# Patient Record
Sex: MALE | Race: OTHER | HISPANIC OR LATINO | URBAN - METROPOLITAN AREA
[De-identification: names, ages, dates, MRNs, and addresses within clinical notes are randomized per-mention and may not be internally consistent; named-entity substitution may affect disease eponyms.]

---

## 2022-03-17 ENCOUNTER — HOSPITAL ENCOUNTER (OUTPATIENT)
Facility: HOSPITAL | Age: 37
Discharge: HOME | End: 2022-03-19
Attending: EMERGENCY MEDICINE | Admitting: INTERNAL MEDICINE

## 2022-03-17 DIAGNOSIS — R06.2 WHEEZING: ICD-10-CM

## 2022-03-17 DIAGNOSIS — J45.901 EXACERBATION OF ASTHMA, UNSPECIFIED ASTHMA SEVERITY, UNSPECIFIED WHETHER PERSISTENT: Primary | ICD-10-CM

## 2022-03-17 LAB
ALBUMIN SERPL BCP-MCNC: 4.8 G/DL (ref 3.5–5.2)
ALP SERPL-CCNC: 158 U/L (ref 55–135)
ALT SERPL W/O P-5'-P-CCNC: 41 U/L (ref 10–44)
ANION GAP SERPL CALC-SCNC: 14 MMOL/L (ref 8–16)
AST SERPL-CCNC: 34 U/L (ref 10–40)
BASOPHILS # BLD AUTO: 0.04 K/UL (ref 0–0.2)
BASOPHILS NFR BLD: 0.4 % (ref 0–1.9)
BILIRUB SERPL-MCNC: 0.8 MG/DL (ref 0.1–1)
BUN SERPL-MCNC: 12 MG/DL (ref 6–20)
CALCIUM SERPL-MCNC: 10.5 MG/DL (ref 8.7–10.5)
CHLORIDE SERPL-SCNC: 102 MMOL/L (ref 95–110)
CO2 SERPL-SCNC: 21 MMOL/L (ref 23–29)
CREAT SERPL-MCNC: 0.8 MG/DL (ref 0.5–1.4)
CTP QC/QA: YES
CTP QC/QA: YES
DIFFERENTIAL METHOD: ABNORMAL
EOSINOPHIL # BLD AUTO: 0.2 K/UL (ref 0–0.5)
EOSINOPHIL NFR BLD: 1.6 % (ref 0–8)
ERYTHROCYTE [DISTWIDTH] IN BLOOD BY AUTOMATED COUNT: 12.7 % (ref 11.5–14.5)
EST. GFR  (AFRICAN AMERICAN): >60 ML/MIN/1.73 M^2
EST. GFR  (NON AFRICAN AMERICAN): >60 ML/MIN/1.73 M^2
GLUCOSE SERPL-MCNC: 135 MG/DL (ref 70–110)
HCT VFR BLD AUTO: 46.5 % (ref 40–54)
HGB BLD-MCNC: 15.6 G/DL (ref 14–18)
IMM GRANULOCYTES # BLD AUTO: 0.03 K/UL (ref 0–0.04)
IMM GRANULOCYTES NFR BLD AUTO: 0.3 % (ref 0–0.5)
LYMPHOCYTES # BLD AUTO: 1 K/UL (ref 1–4.8)
LYMPHOCYTES NFR BLD: 10.7 % (ref 18–48)
MAGNESIUM SERPL-MCNC: 2.5 MG/DL (ref 1.6–2.6)
MCH RBC QN AUTO: 31.6 PG (ref 27–31)
MCHC RBC AUTO-ENTMCNC: 33.5 G/DL (ref 32–36)
MCV RBC AUTO: 94 FL (ref 82–98)
MONOCYTES # BLD AUTO: 0.2 K/UL (ref 0.3–1)
MONOCYTES NFR BLD: 2.3 % (ref 4–15)
NEUTROPHILS # BLD AUTO: 7.7 K/UL (ref 1.8–7.7)
NEUTROPHILS NFR BLD: 84.7 % (ref 38–73)
NRBC BLD-RTO: 0 /100 WBC
PHOSPHATE SERPL-MCNC: 3.4 MG/DL (ref 2.7–4.5)
PLATELET # BLD AUTO: 240 K/UL (ref 150–450)
PMV BLD AUTO: 10.7 FL (ref 9.2–12.9)
POC MOLECULAR INFLUENZA A AGN: NEGATIVE
POC MOLECULAR INFLUENZA B AGN: NEGATIVE
POTASSIUM SERPL-SCNC: 4 MMOL/L (ref 3.5–5.1)
PROT SERPL-MCNC: 9.1 G/DL (ref 6–8.4)
RBC # BLD AUTO: 4.93 M/UL (ref 4.6–6.2)
SARS-COV-2 RDRP RESP QL NAA+PROBE: NEGATIVE
SODIUM SERPL-SCNC: 137 MMOL/L (ref 136–145)
WBC # BLD AUTO: 9.1 K/UL (ref 3.9–12.7)

## 2022-03-17 PROCEDURE — 87502 INFLUENZA DNA AMP PROBE: CPT

## 2022-03-17 PROCEDURE — 83735 ASSAY OF MAGNESIUM: CPT | Performed by: INTERNAL MEDICINE

## 2022-03-17 PROCEDURE — 63600175 PHARM REV CODE 636 W HCPCS: Performed by: INTERNAL MEDICINE

## 2022-03-17 PROCEDURE — 85025 COMPLETE CBC W/AUTO DIFF WBC: CPT | Performed by: INTERNAL MEDICINE

## 2022-03-17 PROCEDURE — 99220 PR INITIAL OBSERVATION CARE,LEVL III: CPT | Mod: ,,, | Performed by: INTERNAL MEDICINE

## 2022-03-17 PROCEDURE — 25000242 PHARM REV CODE 250 ALT 637 W/ HCPCS: Performed by: INTERNAL MEDICINE

## 2022-03-17 PROCEDURE — 25000003 PHARM REV CODE 250: Performed by: INTERNAL MEDICINE

## 2022-03-17 PROCEDURE — 80053 COMPREHEN METABOLIC PANEL: CPT | Performed by: INTERNAL MEDICINE

## 2022-03-17 PROCEDURE — 96375 TX/PRO/DX INJ NEW DRUG ADDON: CPT

## 2022-03-17 PROCEDURE — U0002 COVID-19 LAB TEST NON-CDC: HCPCS | Performed by: EMERGENCY MEDICINE

## 2022-03-17 PROCEDURE — G0378 HOSPITAL OBSERVATION PER HR: HCPCS

## 2022-03-17 PROCEDURE — 99220 PR INITIAL OBSERVATION CARE,LEVL III: ICD-10-PCS | Mod: ,,, | Performed by: INTERNAL MEDICINE

## 2022-03-17 PROCEDURE — 94640 AIRWAY INHALATION TREATMENT: CPT

## 2022-03-17 PROCEDURE — 63600175 PHARM REV CODE 636 W HCPCS: Performed by: EMERGENCY MEDICINE

## 2022-03-17 PROCEDURE — 25000242 PHARM REV CODE 250 ALT 637 W/ HCPCS: Performed by: EMERGENCY MEDICINE

## 2022-03-17 PROCEDURE — 96365 THER/PROPH/DIAG IV INF INIT: CPT

## 2022-03-17 PROCEDURE — 99285 PR EMERGENCY DEPT VISIT,LEVEL V: ICD-10-PCS | Mod: CS,,, | Performed by: EMERGENCY MEDICINE

## 2022-03-17 PROCEDURE — 94761 N-INVAS EAR/PLS OXIMETRY MLT: CPT

## 2022-03-17 PROCEDURE — 99285 EMERGENCY DEPT VISIT HI MDM: CPT | Mod: CS,,, | Performed by: EMERGENCY MEDICINE

## 2022-03-17 PROCEDURE — 99285 EMERGENCY DEPT VISIT HI MDM: CPT | Mod: 25

## 2022-03-17 PROCEDURE — 84100 ASSAY OF PHOSPHORUS: CPT | Performed by: INTERNAL MEDICINE

## 2022-03-17 RX ORDER — SODIUM CHLORIDE 0.9 % (FLUSH) 0.9 %
10 SYRINGE (ML) INJECTION
Status: DISCONTINUED | OUTPATIENT
Start: 2022-03-17 | End: 2022-03-19 | Stop reason: HOSPADM

## 2022-03-17 RX ORDER — TERBINAFINE HYDROCHLORIDE 250 MG/1
250 TABLET ORAL DAILY
COMMUNITY

## 2022-03-17 RX ORDER — METHYLPREDNISOLONE SOD SUCC 125 MG
60 VIAL (EA) INJECTION EVERY 8 HOURS
Status: COMPLETED | OUTPATIENT
Start: 2022-03-17 | End: 2022-03-18

## 2022-03-17 RX ORDER — IPRATROPIUM BROMIDE AND ALBUTEROL SULFATE 2.5; .5 MG/3ML; MG/3ML
3 SOLUTION RESPIRATORY (INHALATION)
Status: DISCONTINUED | OUTPATIENT
Start: 2022-03-17 | End: 2022-03-19 | Stop reason: HOSPADM

## 2022-03-17 RX ORDER — ALBUTEROL SULFATE 2.5 MG/.5ML
10 SOLUTION RESPIRATORY (INHALATION)
Status: COMPLETED | OUTPATIENT
Start: 2022-03-17 | End: 2022-03-17

## 2022-03-17 RX ORDER — IPRATROPIUM BROMIDE AND ALBUTEROL SULFATE 2.5; .5 MG/3ML; MG/3ML
SOLUTION RESPIRATORY (INHALATION)
Status: DISPENSED
Start: 2022-03-17 | End: 2022-03-18

## 2022-03-17 RX ORDER — MAGNESIUM SULFATE HEPTAHYDRATE 40 MG/ML
2 INJECTION, SOLUTION INTRAVENOUS ONCE
Status: COMPLETED | OUTPATIENT
Start: 2022-03-17 | End: 2022-03-17

## 2022-03-17 RX ORDER — PREDNISONE 20 MG/1
60 TABLET ORAL
Status: COMPLETED | OUTPATIENT
Start: 2022-03-17 | End: 2022-03-17

## 2022-03-17 RX ORDER — IPRATROPIUM BROMIDE AND ALBUTEROL SULFATE 2.5; .5 MG/3ML; MG/3ML
3 SOLUTION RESPIRATORY (INHALATION)
Status: COMPLETED | OUTPATIENT
Start: 2022-03-17 | End: 2022-03-17

## 2022-03-17 RX ORDER — MONTELUKAST SODIUM 10 MG/1
10 TABLET ORAL NIGHTLY
COMMUNITY

## 2022-03-17 RX ORDER — CETIRIZINE HYDROCHLORIDE 5 MG/1
10 TABLET ORAL DAILY
Status: DISCONTINUED | OUTPATIENT
Start: 2022-03-18 | End: 2022-03-19 | Stop reason: HOSPADM

## 2022-03-17 RX ORDER — CETIRIZINE HYDROCHLORIDE 10 MG/1
10 TABLET ORAL DAILY
COMMUNITY

## 2022-03-17 RX ORDER — ACETAMINOPHEN 325 MG/1
650 TABLET ORAL EVERY 4 HOURS PRN
Status: DISCONTINUED | OUTPATIENT
Start: 2022-03-17 | End: 2022-03-19 | Stop reason: HOSPADM

## 2022-03-17 RX ORDER — PREDNISONE 20 MG/1
40 TABLET ORAL 2 TIMES DAILY
Status: DISCONTINUED | OUTPATIENT
Start: 2022-03-18 | End: 2022-03-19 | Stop reason: HOSPADM

## 2022-03-17 RX ORDER — TALC
6 POWDER (GRAM) TOPICAL NIGHTLY PRN
Status: DISCONTINUED | OUTPATIENT
Start: 2022-03-17 | End: 2022-03-19 | Stop reason: HOSPADM

## 2022-03-17 RX ORDER — FAMOTIDINE 20 MG/1
20 TABLET, FILM COATED ORAL EVERY 12 HOURS PRN
Status: DISCONTINUED | OUTPATIENT
Start: 2022-03-17 | End: 2022-03-19 | Stop reason: HOSPADM

## 2022-03-17 RX ORDER — MONTELUKAST SODIUM 10 MG/1
10 TABLET ORAL NIGHTLY
Status: DISCONTINUED | OUTPATIENT
Start: 2022-03-17 | End: 2022-03-19 | Stop reason: HOSPADM

## 2022-03-17 RX ADMIN — IPRATROPIUM BROMIDE AND ALBUTEROL SULFATE 3 ML: 2.5; .5 SOLUTION RESPIRATORY (INHALATION) at 05:03

## 2022-03-17 RX ADMIN — ALBUTEROL SULFATE 10 MG: 2.5 SOLUTION RESPIRATORY (INHALATION) at 06:03

## 2022-03-17 RX ADMIN — IPRATROPIUM BROMIDE AND ALBUTEROL SULFATE 3 ML: 2.5; .5 SOLUTION RESPIRATORY (INHALATION) at 08:03

## 2022-03-17 RX ADMIN — MONTELUKAST 10 MG: 10 TABLET, FILM COATED ORAL at 11:03

## 2022-03-17 RX ADMIN — MAGNESIUM SULFATE HEPTAHYDRATE 2 G: 2 INJECTION, SOLUTION INTRAVENOUS at 09:03

## 2022-03-17 RX ADMIN — PREDNISONE 60 MG: 20 TABLET ORAL at 04:03

## 2022-03-17 RX ADMIN — METHYLPREDNISOLONE SODIUM SUCCINATE 60 MG: 125 INJECTION, POWDER, FOR SOLUTION INTRAMUSCULAR; INTRAVENOUS at 10:03

## 2022-03-17 NOTE — ED PROVIDER NOTES
SCRIBE #1 NOTE: I, Taj Samaniego, am scribing for, and in the presence of,  Arash Mckinney MD. I have scribed the following portions of the note -         Source of History:  Patient  significant other    Chief complaint:  Asthma (Inhaler not helping couldn't sleep last night. Patient speaking in full sentences and in NAD)      HPI:   Ronny Byers is a 36 y.o. male who presents with a chief complaint of asthma, cough, shortness of breath that has been bad for the last several days.  He has an associated cough with phlegm, no fever, no upper respiratory symptoms or sick contacts.  Patient asthma symptoms started when he got a Guinea pig 1 year ago, he intermittently uses an albuterol inhaler but has never had to come to the ER before.  Albuterol was not helping him yesterday and this morning.  He notes that when he is away for work his symptoms improved, but when he comes home to a Guinea pig they get worse.  Denies any chest pains, but notes some posterior lateral pain associated with coughing.  No sick contacts.              ROS: As per HPI and below:    General: No fever.  No chills.  Eyes: No visual changes.  Head: No headache.    Integument: No rashes or lesions.  Chest:  shortness of breath.  Cardiovascular: No chest pain.  Abdomen: No abdominal pain.  No nausea or vomiting.  Urinary: No abnormal urination.  Neurologic: No focal weakness.  No numbness.  Hematologic: No easy bruising.  Endocrine: No excessive thirst or urination.      Review of patient's allergies indicates:  No Known Allergies    No current facility-administered medications on file prior to encounter.     No current outpatient medications on file prior to encounter.       PMH:  As per HPI and below:  Past Medical History:   Diagnosis Date    Asthma      History reviewed. No pertinent surgical history.    Social History     Socioeconomic History    Marital status: Significant Other   Tobacco Use    Smoking status: Never Smoker     Smokeless tobacco: Never Used       History reviewed. No pertinent family history.    Physical Exam:    Vitals:    03/17/22 1542   BP: (!) 166/100   Pulse: 102   Resp: (!) 24   Temp: 98.5 °F (36.9 °C)     Appearance: No acute distress.  Skin: No rashes seen.  Good turgor.  No abrasions.  No ecchymoses.  Eyes: No conjunctival injection. EOMI, PERRL.  ENT: Oropharynx clear.    Chest:  Diffuse wheezing throughout all lung fields.  Mild increased work of breathing..  Cardiovascular: Regular rate and rhythm.  Normal equal bilateral radial pulses.  Abdomen: Soft.  Not distended.  Nontender.  No guarding.  No rebound. No Masses  Musculoskeletal: Good range of motion all joints.  No deformities.  Neck supple, full range of motion, no obvious deformity.  Neurologic: Moves all extremities.  Normal sensation.  No facial droop.  Normal speech.    Mental Status:  Alert and oriented x 3.  Appropriate, conversant.          Laboratory Studies:  Labs Reviewed   SARS-COV-2 RDRP GENE    Narrative:     This test utilizes isothermal nucleic acid amplification   technology to detect the SARS-CoV-2 RdRp nucleic acid segment.   The analytical sensitivity (limit of detection) is 125 genome   equivalents/mL.   A POSITIVE result implies infection with the SARS-CoV-2 virus;   the patient is presumed to be contagious.     A NEGATIVE result means that SARS-CoV-2 nucleic acids are not   present above the limit of detection. A NEGATIVE result should be   treated as presumptive. It does not rule out the possibility of   COVID-19 and should not be the sole basis for treatment decisions.   If COVID-19 is strongly suspected based on clinical and exposure   history, re-testing using an alternate molecular assay should be   considered.   This test is only for use under the Food and Drug   Administration s Emergency Use Authorization (EUA).   Commercial kits are provided by [employer].   Performance characteristics of the EUA have been  independently   verified by [facility] Department of   Pathology and Laboratory Medicine.   _________________________________________________________________   The authorized Fact Sheet for Healthcare Providers and the authorized Fact   Sheet for Patients of the ID NOW COVID-19 are available on the FDA   website:     https://www.fda.gov/media/125262/download  https://www.fda.gov/media/105122/download                  Imaging Results    None         Medications Given:  Medications   albuterol-ipratropium 2.5 mg-0.5 mg/3 mL nebulizer solution 3 mL (has no administration in time range)   predniSONE tablet 60 mg (60 mg Oral Given 3/17/22 9279)       Discussed with:  Patient    MDM:    36 y.o. male with shortness of breath and cough, initially concerning for asthma exacerbation.  COVID negative, no flu symptoms, no fever to suggest pneumonia.  This is consistent with previous asthma exacerbations.  Will give steroids, DuoNebs and re-evaluate.  If improved, and SpO2 is better, he may go home with prednisone, otherwise would consider additional treatments.  Signed out to oncoming attending.    Diagnostic Impression:    1. Exacerbation of asthma, unspecified asthma severity, unspecified whether persistent               Arash Mckinney MD  03/17/22 5381

## 2022-03-17 NOTE — ED NOTES
"Patient identifiers verified and correct for Ronny Steven   C/C: Pt states "I am having problems breathing problems"  APPEARANCE: awake and alert in no acute distress.  SKIN: warm, dry and intact. No breakdown or bruising.  MUSCULOSKELETAL: Patient moving all extremities spontaneously, no obvious swelling or deformities noted. Ambulates independently.  RESPIRATORY: confirms shortness of breath. Respirations unlabored. Wheezes auscultated bilaterally   CARDIAC: Denies CP, 2+ distal pulses; no peripheral edema  ABDOMEN: soft, non-tender, and non-distended, Denies N/V  : voids spontaneously, denies difficulty  Neurologic: AAO x 4; follows commands equal strength in all extremities; denies numbness/tingling. Denies dizziness  "

## 2022-03-18 PROCEDURE — 96376 TX/PRO/DX INJ SAME DRUG ADON: CPT

## 2022-03-18 PROCEDURE — 99226 PR SUBSEQUENT OBSERVATION CARE,LEVEL III: ICD-10-PCS | Mod: ,,,

## 2022-03-18 PROCEDURE — 63600175 PHARM REV CODE 636 W HCPCS: Performed by: INTERNAL MEDICINE

## 2022-03-18 PROCEDURE — 94761 N-INVAS EAR/PLS OXIMETRY MLT: CPT

## 2022-03-18 PROCEDURE — 99900035 HC TECH TIME PER 15 MIN (STAT)

## 2022-03-18 PROCEDURE — 94640 AIRWAY INHALATION TREATMENT: CPT

## 2022-03-18 PROCEDURE — 25000003 PHARM REV CODE 250: Performed by: INTERNAL MEDICINE

## 2022-03-18 PROCEDURE — G0378 HOSPITAL OBSERVATION PER HR: HCPCS

## 2022-03-18 PROCEDURE — 27000221 HC OXYGEN, UP TO 24 HOURS

## 2022-03-18 PROCEDURE — 25000242 PHARM REV CODE 250 ALT 637 W/ HCPCS: Performed by: INTERNAL MEDICINE

## 2022-03-18 PROCEDURE — 99226 PR SUBSEQUENT OBSERVATION CARE,LEVEL III: CPT | Mod: ,,,

## 2022-03-18 RX ADMIN — PREDNISONE 40 MG: 20 TABLET ORAL at 09:03

## 2022-03-18 RX ADMIN — IPRATROPIUM BROMIDE AND ALBUTEROL SULFATE 3 ML: 2.5; .5 SOLUTION RESPIRATORY (INHALATION) at 04:03

## 2022-03-18 RX ADMIN — FLUTICASONE FUROATE 1 PUFF: 200 POWDER RESPIRATORY (INHALATION) at 01:03

## 2022-03-18 RX ADMIN — IPRATROPIUM BROMIDE AND ALBUTEROL SULFATE 3 ML: 2.5; .5 SOLUTION RESPIRATORY (INHALATION) at 07:03

## 2022-03-18 RX ADMIN — IPRATROPIUM BROMIDE AND ALBUTEROL SULFATE 3 ML: 2.5; .5 SOLUTION RESPIRATORY (INHALATION) at 01:03

## 2022-03-18 RX ADMIN — METHYLPREDNISOLONE SODIUM SUCCINATE 60 MG: 125 INJECTION, POWDER, FOR SOLUTION INTRAMUSCULAR; INTRAVENOUS at 05:03

## 2022-03-18 RX ADMIN — METHYLPREDNISOLONE SODIUM SUCCINATE 60 MG: 125 INJECTION, POWDER, FOR SOLUTION INTRAMUSCULAR; INTRAVENOUS at 01:03

## 2022-03-18 RX ADMIN — CETIRIZINE HYDROCHLORIDE 10 MG: 5 TABLET ORAL at 09:03

## 2022-03-18 RX ADMIN — MONTELUKAST 10 MG: 10 TABLET, FILM COATED ORAL at 09:03

## 2022-03-18 NOTE — H&P
[facility] - Emergency Dept  Hospital Medicine  History & Physical    Patient Name: Ronny Byers  MRN: [id number]  Patient Class: OP- Observation  Admission Date: 3/17/2022  Attending Physician: Milton Mohr MD   Primary Care Provider: No primary care provider on file.         Patient information was obtained from patient, relative(s) and ER records.     Subjective:     Principal Problem:Asthma exacerbation    Chief Complaint:   Chief Complaint   Patient presents with    Asthma     Inhaler not helping couldn't sleep last night. Patient speaking in full sentences and in NAD        HPI: 36M with PMH asthma, seasonal allergies who p/w asthma exacerbation with sudden onset today at work.  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  Wheezing, dyspnea principal symptoms endorsed.  Cough (nonproductive) also endorsed.  Denies sick contacts.  Denies any infectious symptomatolgy.  Has had asthma attacks before, but none to this severity.   First time in ED, hospital for this.  Reports use of Cetirizine, Montelukast, and Albuterol INH with good control typically.  He reports having some allergic symptoms to pet dander, hair.      In ED given steroids, nebulized treatment with good response.  Wheezing persists though patient comfortable, speaking full sentences, and GEN.  Patient agreeable to further management.  CXR WNL. Flu, COVID negative.  Other labs pending - ordered. Will be admitted for management with asthma order set.   History obtained with assistance by .       Past Medical History:   Diagnosis Date    Asthma        Past Surgical History:   Procedure Laterality Date    MANDIBLE FRACTURE SURGERY         Review of patient's allergies indicates:  No Known Allergies    No current facility-administered medications on file prior to encounter.     Current Outpatient Medications on File Prior to Encounter   Medication Sig    cetirizine (ZYRTEC) 10 MG tablet  Take 10 mg by mouth once daily.    montelukast (SINGULAIR) 10 mg tablet Take 10 mg by mouth every evening.    terbinafine HCL (LAMISIL) 250 mg tablet Take 250 mg by mouth once daily.     Family History    None       Tobacco Use    Smoking status: Never Smoker    Smokeless tobacco: Never Used   Substance and Sexual Activity    Alcohol use: Not on file    Drug use: Not on file    Sexual activity: Not on file     Review of Systems   Constitutional:  Negative for activity change, appetite change, chills, diaphoresis, fatigue and fever.   HENT:  Positive for congestion and sinus pressure. Negative for facial swelling, hearing loss, nosebleeds, sinus pain, sneezing, sore throat, tinnitus, trouble swallowing and voice change.    Eyes:  Negative for photophobia, pain, discharge and visual disturbance.   Respiratory:  Positive for shortness of breath. Negative for cough, chest tightness and wheezing.    Cardiovascular:  Negative for chest pain, palpitations and leg swelling.   Gastrointestinal:  Negative for abdominal distention, abdominal pain, blood in stool, constipation, diarrhea, nausea and vomiting.   Endocrine: Negative for cold intolerance, heat intolerance and polyuria.   Genitourinary:  Negative for decreased urine volume, difficulty urinating, dysuria, flank pain, frequency and urgency.   Musculoskeletal:  Negative for arthralgias, gait problem, myalgias, neck pain and neck stiffness.   Skin:  Negative for rash and wound.   Neurological:  Negative for dizziness, speech difficulty, weakness, light-headedness and headaches.   Hematological:  Negative for adenopathy. Does not bruise/bleed easily.   Psychiatric/Behavioral:  Negative for agitation, confusion, self-injury, sleep disturbance and suicidal ideas. The patient is not nervous/anxious.    Objective:     Vital Signs (Most Recent):  Temp: 98.2 °F (36.8 °C) (03/17/22 2157)  Pulse: 98 (03/17/22 2157)  Resp: 20 (03/17/22 2157)  BP: 132/88 (03/17/22  2157)  SpO2: 96 % (03/17/22 2157) Vital Signs (24h Range):  Temp:  [98.2 °F (36.8 °C)-98.8 °F (37.1 °C)] 98.2 °F (36.8 °C)  Pulse:  [] 98  Resp:  [16-24] 20  SpO2:  [91 %-96 %] 96 %  BP: (132-166)/() 132/88     Weight: 88.9 kg (196 lb)  Body mass index is 31.64 kg/m².    Physical Exam  Constitutional:       General: He is not in acute distress.     Appearance: He is well-developed. He is not ill-appearing, toxic-appearing or diaphoretic.   HENT:      Head: Atraumatic. No abrasion, contusion or laceration.      Nose: Nose normal.      Mouth/Throat:      Pharynx: No oropharyngeal exudate.   Eyes:      General: No scleral icterus.        Right eye: No discharge.         Left eye: No discharge.      Conjunctiva/sclera: Conjunctivae normal.      Pupils: Pupils are equal, round, and reactive to light.   Neck:      Vascular: No JVD.   Cardiovascular:      Rate and Rhythm: Normal rate and regular rhythm.      Heart sounds: Normal heart sounds. No murmur heard.    No friction rub. No gallop.   Pulmonary:      Effort: Pulmonary effort is normal. No accessory muscle usage or respiratory distress.      Breath sounds: Wheezing and rhonchi present.   Abdominal:      General: Bowel sounds are normal. There is no distension.      Palpations: Abdomen is soft. There is no mass.      Tenderness: There is no abdominal tenderness. There is no guarding or rebound.   Musculoskeletal:         General: No tenderness or deformity. Normal range of motion.      Cervical back: Normal range of motion and neck supple. No rigidity.   Lymphadenopathy:      Cervical: No cervical adenopathy.   Skin:     General: Skin is warm and dry.      Capillary Refill: Capillary refill takes less than 2 seconds.      Findings: No bruising, ecchymosis, erythema, petechiae or rash.      Nails: There is no clubbing.   Neurological:      Mental Status: He is alert and oriented to person, place, and time.      GCS: GCS eye subscore is 4. GCS verbal  subscore is 5. GCS motor subscore is 6.      Cranial Nerves: No cranial nerve deficit.      Sensory: No sensory deficit.      Motor: No abnormal muscle tone.      Coordination: Coordination normal.      Deep Tendon Reflexes: Reflexes normal.   Psychiatric:         Speech: Speech normal.         Behavior: Behavior normal.         Thought Content: Thought content normal.         Judgment: Judgment normal.         CRANIAL NERVES     CN III, IV, VI   Pupils are equal, round, and reactive to light.     Significant Labs: All pertinent labs within the past 24 hours have been reviewed.  CBC: No results for input(s): WBC, HGB, HCT, PLT in the last 48 hours.  CMP: No results for input(s): NA, K, CL, CO2, GLU, BUN, CREATININE, CALCIUM, PROT, ALBUMIN, BILITOT, ALKPHOS, AST, ALT, ANIONGAP, EGFRNONAA in the last 48 hours.    Invalid input(s): ESTGFAFRICA  Recent Lab Results         03/17/22 2018 03/17/22  1648        POC Molecular Influenza A Ag Negative         POC Molecular Influenza B Ag Negative          Acceptable Yes   Yes       SARS-CoV-2 RNA, Amplification, Qual   Negative               Significant Imaging: I have reviewed all pertinent imaging results/findings within the past 24 hours.  CXR: I have reviewed all pertinent results/findings within the past 24 hours and my personal findings are:  clear lung fields.     Assessment/Plan:     * Asthma exacerbation  · Admit to  - Observation  · Asthma Order Set  · A/A nebulizer treatment q4 when awake  · IV Dexamethasone followed by PO step down therapy  · Patient GEN, monitor  · Patient will be co-managed with RT, peak flow protocol   · Continue Cetirizine, Montelukast per home dose   · No clear precipitants - possible inhalation of particles at worksite   · Flu, Covid negative   · CXR WNL  · Vitals, pulse oximetry   · FULL CODE       VTE Risk Mitigation (From admission, onward)         Ordered     IP VTE HIGH RISK PATIENT  Once         03/17/22 2037      Place sequential compression device  Until discontinued         03/17/22 2037                   Milton Mohr MD  Department of Hospital Medicine   [facility] - Emergency Dept

## 2022-03-18 NOTE — ASSESSMENT & PLAN NOTE
· Admit to  - Observation  · Asthma Order Set  · A/A nebulizer treatment q4 when awake  · IV Dexamethasone followed by PO step down therapy  · Patient GEN, monitor  · Patient will be co-managed with RT, peak flow protocol   · Continue Cetirizine, Montelukast per home dose   · No clear precipitants - possible inhalation of particles at worksite   · Flu, Covid negative   · CXR WNL  · Vitals, pulse oximetry   · FULL CODE

## 2022-03-18 NOTE — HOSPITAL COURSE
Patient admitted to observation for asthma exacerbation. Receiving scheduled Duonebs and steroids. IV dexamethasone initiated for 24 hours at admission, will de-escalate with prednisone 40 mg BID. Patient reports improvement in shortness of breath, but is still experiencing wheezing and requiring 2L NC. Wheezing and shortness of breath resolved, stable for discharge. Albuterol, prednisone, and ICS delivered to bedside. PCP appointment set up with [facility]. Return precautions given, patient verbalized understanding.

## 2022-03-18 NOTE — ASSESSMENT & PLAN NOTE
· Admit to  - Observation  · Asthma Order Set  · A/A nebulizer treatment q4 when awake  · IV Dexamethasone followed by PO step down therapy  · Initiate po steroids 3/19  · On 2L NC, plan to wean off over the next 12-24 hours  · Patient will be co-managed with RT, peak flow protocol   · Continue Cetirizine, Montelukast per home dose   · No clear precipitants - possible inhalation of particles at worksite or pollen  · Flu, Covid negative   · CXR WNL  · Vitals, pulse oximetry   · FULL CODE

## 2022-03-18 NOTE — PLAN OF CARE
[facility] - Telemetry Stepdown ([place]-)  Initial Discharge Assessment       Primary Care Provider: No primary care provider on file.    Admission Diagnosis: Wheezing [R06.2]  Exacerbation of asthma, unspecified asthma severity, unspecified whether persistent [J45.901]    Admission Date: 3/17/2022  Expected Discharge Date: 3/19/2022         Payor: /     Extended Emergency Contact Information  Primary Emergency Contact: Joya Nelson  Mobile Phone: [phone]  Relation: Significant other  Preferred language: Mohawk   needed? Yes    Discharge Plan A: (P) Home with family  Discharge Plan B: (P) Home with family    No Pharmacies Listed             SW completed Discharge Planning Assessment with the assistance of a [place] .    SW completed Discharge Planning Assessment with patient via bedside. Discharge planning booklet given to patient/family and whiteboard updated with CINDA and phone #. All questions answered.    Patient reported that wife will provide transportation upon discharge.     Patient reported that he lives at home with his wife and daughter and prior to hospitalization he was independent with his ADL's. Patient reported that he is not on dialysis and he does not go to a Coumadin clinic.     Patient lives in a [facility] with one step to enter.       Virginia Zheng LMSW  [facility]  Ext. [phone]

## 2022-03-18 NOTE — SUBJECTIVE & OBJECTIVE
Interval History: Patient seen and examined today while utilizing . Patient states that he believes pollen exacerbated his asthma attack and reports that his albuterol inhaler did not provide adequate relief. Overnight, the patient sat at 91% RA requiring 2L NC. Patient reports significant improvement in shortness of breath and wheezing. Continuing scheduled Duonebs and steroids and plan for discharge tomorrow morning.     Review of Systems   Constitutional:  Negative for activity change, appetite change, chills, diaphoresis, fatigue and fever.   HENT:  Positive for congestion and sinus pressure. Negative for facial swelling, hearing loss, nosebleeds, sinus pain, sneezing, sore throat, tinnitus, trouble swallowing and voice change.    Eyes:  Negative for photophobia, pain, discharge and visual disturbance.   Respiratory:  Positive for shortness of breath. Negative for cough, chest tightness and wheezing.    Cardiovascular:  Negative for chest pain, palpitations and leg swelling.   Gastrointestinal:  Negative for abdominal distention, abdominal pain, blood in stool, constipation, diarrhea, nausea and vomiting.   Endocrine: Negative for cold intolerance, heat intolerance and polyuria.   Genitourinary:  Negative for decreased urine volume, difficulty urinating, dysuria, flank pain, frequency and urgency.   Musculoskeletal:  Negative for arthralgias, gait problem, myalgias, neck pain and neck stiffness.   Skin:  Negative for rash and wound.   Neurological:  Negative for dizziness, speech difficulty, weakness, light-headedness and headaches.   Hematological:  Negative for adenopathy. Does not bruise/bleed easily.   Psychiatric/Behavioral:  Negative for agitation, confusion, self-injury, sleep disturbance and suicidal ideas. The patient is not nervous/anxious.    Objective:     Vital Signs (Most Recent):  Temp: 97.7 °F (36.5 °C) (03/18/22 1134)  Pulse: 91 (03/18/22 1134)  Resp: 18 (03/18/22 1134)  BP:  135/79 (03/18/22 1134)  SpO2: 96 % (03/18/22 1134) Vital Signs (24h Range):  Temp:  [96.5 °F (35.8 °C)-98.8 °F (37.1 °C)] 97.7 °F (36.5 °C)  Pulse:  [] 91  Resp:  [16-24] 18  SpO2:  [91 %-97 %] 96 %  BP: (129-166)/() 135/79     Weight: 89.1 kg (196 lb 6.4 oz)  Body mass index is 31.7 kg/m².    Intake/Output Summary (Last 24 hours) at 3/18/2022 1207  Last data filed at 3/17/2022 2158  Gross per 24 hour   Intake 50 ml   Output --   Net 50 ml      Physical Exam  Constitutional:       General: He is not in acute distress.     Appearance: He is well-developed. He is not ill-appearing, toxic-appearing or diaphoretic.   HENT:      Head: Atraumatic. No abrasion, contusion or laceration.      Nose: Nose normal.      Mouth/Throat:      Pharynx: No oropharyngeal exudate.   Eyes:      General: No scleral icterus.        Right eye: No discharge.         Left eye: No discharge.      Conjunctiva/sclera: Conjunctivae normal.      Pupils: Pupils are equal, round, and reactive to light.   Neck:      Vascular: No JVD.   Cardiovascular:      Rate and Rhythm: Normal rate and regular rhythm.      Heart sounds: Normal heart sounds. No murmur heard.    No friction rub. No gallop.   Pulmonary:      Effort: Pulmonary effort is normal. No accessory muscle usage or respiratory distress.      Breath sounds: Decreased air movement present. Wheezing (expiratory) present.   Abdominal:      General: Bowel sounds are normal. There is no distension.      Palpations: Abdomen is soft. There is no mass.      Tenderness: There is no abdominal tenderness. There is no guarding or rebound.   Musculoskeletal:         General: No tenderness or deformity. Normal range of motion.      Cervical back: Normal range of motion and neck supple. No rigidity.   Lymphadenopathy:      Cervical: No cervical adenopathy.   Skin:     General: Skin is warm and dry.      Capillary Refill: Capillary refill takes less than 2 seconds.      Findings: No bruising,  ecchymosis, erythema, petechiae or rash.      Nails: There is no clubbing.   Neurological:      Mental Status: He is alert and oriented to person, place, and time.      GCS: GCS eye subscore is 4. GCS verbal subscore is 5. GCS motor subscore is 6.      Cranial Nerves: No cranial nerve deficit.      Sensory: No sensory deficit.      Motor: No abnormal muscle tone.      Coordination: Coordination normal.      Deep Tendon Reflexes: Reflexes normal.   Psychiatric:         Speech: Speech normal.         Behavior: Behavior normal.         Thought Content: Thought content normal.         Judgment: Judgment normal.       Significant Labs: All pertinent labs within the past 24 hours have been reviewed.  BMP:   Recent Labs   Lab 03/17/22  2224   *      K 4.0      CO2 21*   BUN 12   CREATININE 0.8   CALCIUM 10.5   MG 2.5     CBC:   Recent Labs   Lab 03/17/22  2224   WBC 9.10   HGB 15.6   HCT 46.5          Significant Imaging: I have reviewed all pertinent imaging results/findings within the past 24 hours.

## 2022-03-18 NOTE — SUBJECTIVE & OBJECTIVE
Past Medical History:   Diagnosis Date    Asthma        Past Surgical History:   Procedure Laterality Date    MANDIBLE FRACTURE SURGERY         Review of patient's allergies indicates:  No Known Allergies    No current facility-administered medications on file prior to encounter.     Current Outpatient Medications on File Prior to Encounter   Medication Sig    cetirizine (ZYRTEC) 10 MG tablet Take 10 mg by mouth once daily.    montelukast (SINGULAIR) 10 mg tablet Take 10 mg by mouth every evening.    terbinafine HCL (LAMISIL) 250 mg tablet Take 250 mg by mouth once daily.     Family History    None       Tobacco Use    Smoking status: Never Smoker    Smokeless tobacco: Never Used   Substance and Sexual Activity    Alcohol use: Not on file    Drug use: Not on file    Sexual activity: Not on file     Review of Systems   Constitutional:  Negative for activity change, appetite change, chills, diaphoresis, fatigue and fever.   HENT:  Positive for congestion and sinus pressure. Negative for facial swelling, hearing loss, nosebleeds, sinus pain, sneezing, sore throat, tinnitus, trouble swallowing and voice change.    Eyes:  Negative for photophobia, pain, discharge and visual disturbance.   Respiratory:  Positive for shortness of breath. Negative for cough, chest tightness and wheezing.    Cardiovascular:  Negative for chest pain, palpitations and leg swelling.   Gastrointestinal:  Negative for abdominal distention, abdominal pain, blood in stool, constipation, diarrhea, nausea and vomiting.   Endocrine: Negative for cold intolerance, heat intolerance and polyuria.   Genitourinary:  Negative for decreased urine volume, difficulty urinating, dysuria, flank pain, frequency and urgency.   Musculoskeletal:  Negative for arthralgias, gait problem, myalgias, neck pain and neck stiffness.   Skin:  Negative for rash and wound.   Neurological:  Negative for dizziness, speech difficulty, weakness, light-headedness and headaches.    Hematological:  Negative for adenopathy. Does not bruise/bleed easily.   Psychiatric/Behavioral:  Negative for agitation, confusion, self-injury, sleep disturbance and suicidal ideas. The patient is not nervous/anxious.    Objective:     Vital Signs (Most Recent):  Temp: 98.2 °F (36.8 °C) (03/17/22 2157)  Pulse: 98 (03/17/22 2157)  Resp: 20 (03/17/22 2157)  BP: 132/88 (03/17/22 2157)  SpO2: 96 % (03/17/22 2157) Vital Signs (24h Range):  Temp:  [98.2 °F (36.8 °C)-98.8 °F (37.1 °C)] 98.2 °F (36.8 °C)  Pulse:  [] 98  Resp:  [16-24] 20  SpO2:  [91 %-96 %] 96 %  BP: (132-166)/() 132/88     Weight: 88.9 kg (196 lb)  Body mass index is 31.64 kg/m².    Physical Exam  Constitutional:       General: He is not in acute distress.     Appearance: He is well-developed. He is not ill-appearing, toxic-appearing or diaphoretic.   HENT:      Head: Atraumatic. No abrasion, contusion or laceration.      Nose: Nose normal.      Mouth/Throat:      Pharynx: No oropharyngeal exudate.   Eyes:      General: No scleral icterus.        Right eye: No discharge.         Left eye: No discharge.      Conjunctiva/sclera: Conjunctivae normal.      Pupils: Pupils are equal, round, and reactive to light.   Neck:      Vascular: No JVD.   Cardiovascular:      Rate and Rhythm: Normal rate and regular rhythm.      Heart sounds: Normal heart sounds. No murmur heard.    No friction rub. No gallop.   Pulmonary:      Effort: Pulmonary effort is normal. No accessory muscle usage or respiratory distress.      Breath sounds: Wheezing and rhonchi present.   Abdominal:      General: Bowel sounds are normal. There is no distension.      Palpations: Abdomen is soft. There is no mass.      Tenderness: There is no abdominal tenderness. There is no guarding or rebound.   Musculoskeletal:         General: No tenderness or deformity. Normal range of motion.      Cervical back: Normal range of motion and neck supple. No rigidity.   Lymphadenopathy:       Cervical: No cervical adenopathy.   Skin:     General: Skin is warm and dry.      Capillary Refill: Capillary refill takes less than 2 seconds.      Findings: No bruising, ecchymosis, erythema, petechiae or rash.      Nails: There is no clubbing.   Neurological:      Mental Status: He is alert and oriented to person, place, and time.      GCS: GCS eye subscore is 4. GCS verbal subscore is 5. GCS motor subscore is 6.      Cranial Nerves: No cranial nerve deficit.      Sensory: No sensory deficit.      Motor: No abnormal muscle tone.      Coordination: Coordination normal.      Deep Tendon Reflexes: Reflexes normal.   Psychiatric:         Speech: Speech normal.         Behavior: Behavior normal.         Thought Content: Thought content normal.         Judgment: Judgment normal.         CRANIAL NERVES     CN III, IV, VI   Pupils are equal, round, and reactive to light.     Significant Labs: All pertinent labs within the past 24 hours have been reviewed.  CBC: No results for input(s): WBC, HGB, HCT, PLT in the last 48 hours.  CMP: No results for input(s): NA, K, CL, CO2, GLU, BUN, CREATININE, CALCIUM, PROT, ALBUMIN, BILITOT, ALKPHOS, AST, ALT, ANIONGAP, EGFRNONAA in the last 48 hours.    Invalid input(s): ESTGFAFR[place]  Recent Lab Results         03/17/22 2018 03/17/22  1648        POC Molecular Influenza A Ag Negative         POC Molecular Influenza B Ag Negative          Acceptable Yes   Yes       SARS-CoV-2 RNA, Amplification, Qual   Negative               Significant Imaging: I have reviewed all pertinent imaging results/findings within the past 24 hours.  CXR: I have reviewed all pertinent results/findings within the past 24 hours and my personal findings are:  clear lung fields.

## 2022-03-18 NOTE — PROGRESS NOTES
[facility] - Tele[facility] Stepdown ([address] Medicine  Progress Note    Patient Name: Ronny Byers  MRN: [id number]  Patient Class: OP- Observation   Admission Date: 3/17/2022  Length of Stay: 0 days  Attending Physician: Pj Howell MD  Primary Care Provider: No primary care provider on file.        Subjective:     Principal Problem:Asthma exacerbation        HPI:  36M with PMH asthma, seasonal allergies who p/w asthma exacerbation with sudden onset today at work.  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  Wheezing, dyspnea principal symptoms endorsed.  Cough (nonproductive) also endorsed.  Denies sick contacts.  Denies any infectious symptomatolgy.  Has had asthma attacks before, but none to this severity.   First time in ED, hospital for this.  Reports use of Cetirizine, Montelukast, and Albuterol INH with good control typically.  He reports having some allergic symptoms to pet dander, hair.      In ED given steroids, nebulized treatment with good response.  Wheezing persists though patient comfortable, speaking full sentences, and GEN.  Patient agreeable to further management.  CXR WNL. Flu, COVID negative.  Other labs pending - ordered. Will be admitted for management with asthma order set.   History obtained with assistance by .       Overview/Hospital Course:  Patient admitted to observation for asthma exacerbation. Receiving scheduled Duonebs and steroids. IV dexamethasone initiated for 24 hours at admission, will de-escalate with prednisone 40 mg BID. Patient reports improvement in shortness of breath, but is still experiencing wheezing and requiring 2L NC.      Interval History: Patient seen and examined today while utilizing . Patient states that he believes pollen exacerbated his asthma attack and reports that his albuterol inhaler did not provide adequate relief. Overnight, the patient sat at 91% RA  requiring 2L NC. Patient reports significant improvement in shortness of breath and wheezing. Continuing scheduled Duonebs and steroids and plan for discharge tomorrow morning.     Review of Systems   Constitutional:  Negative for activity change, appetite change, chills, diaphoresis, fatigue and fever.   HENT:  Positive for congestion and sinus pressure. Negative for facial swelling, hearing loss, nosebleeds, sinus pain, sneezing, sore throat, tinnitus, trouble swallowing and voice change.    Eyes:  Negative for photophobia, pain, discharge and visual disturbance.   Respiratory:  Positive for shortness of breath. Negative for cough, chest tightness and wheezing.    Cardiovascular:  Negative for chest pain, palpitations and leg swelling.   Gastrointestinal:  Negative for abdominal distention, abdominal pain, blood in stool, constipation, diarrhea, nausea and vomiting.   Endocrine: Negative for cold intolerance, heat intolerance and polyuria.   Genitourinary:  Negative for decreased urine volume, difficulty urinating, dysuria, flank pain, frequency and urgency.   Musculoskeletal:  Negative for arthralgias, gait problem, myalgias, neck pain and neck stiffness.   Skin:  Negative for rash and wound.   Neurological:  Negative for dizziness, speech difficulty, weakness, light-headedness and headaches.   Hematological:  Negative for adenopathy. Does not bruise/bleed easily.   Psychiatric/Behavioral:  Negative for agitation, confusion, self-injury, sleep disturbance and suicidal ideas. The patient is not nervous/anxious.    Objective:     Vital Signs (Most Recent):  Temp: 97.7 °F (36.5 °C) (03/18/22 1134)  Pulse: 91 (03/18/22 1134)  Resp: 18 (03/18/22 1134)  BP: 135/79 (03/18/22 1134)  SpO2: 96 % (03/18/22 1134) Vital Signs (24h Range):  Temp:  [96.5 °F (35.8 °C)-98.8 °F (37.1 °C)] 97.7 °F (36.5 °C)  Pulse:  [] 91  Resp:  [16-24] 18  SpO2:  [91 %-97 %] 96 %  BP: (129-166)/() 135/79     Weight: 89.1 kg (196 lb  6.4 oz)  Body mass index is 31.7 kg/m².    Intake/Output Summary (Last 24 hours) at 3/18/2022 1207  Last data filed at 3/17/2022 2158  Gross per 24 hour   Intake 50 ml   Output --   Net 50 ml      Physical Exam  Constitutional:       General: He is not in acute distress.     Appearance: He is well-developed. He is not ill-appearing, toxic-appearing or diaphoretic.   HENT:      Head: Atraumatic. No abrasion, contusion or laceration.      Nose: Nose normal.      Mouth/Throat:      Pharynx: No oropharyngeal exudate.   Eyes:      General: No scleral icterus.        Right eye: No discharge.         Left eye: No discharge.      Conjunctiva/sclera: Conjunctivae normal.      Pupils: Pupils are equal, round, and reactive to light.   Neck:      Vascular: No JVD.   Cardiovascular:      Rate and Rhythm: Normal rate and regular rhythm.      Heart sounds: Normal heart sounds. No murmur heard.    No friction rub. No gallop.   Pulmonary:      Effort: Pulmonary effort is normal. No accessory muscle usage or respiratory distress.      Breath sounds: Decreased air movement present. Wheezing (expiratory) present.   Abdominal:      General: Bowel sounds are normal. There is no distension.      Palpations: Abdomen is soft. There is no mass.      Tenderness: There is no abdominal tenderness. There is no guarding or rebound.   Musculoskeletal:         General: No tenderness or deformity. Normal range of motion.      Cervical back: Normal range of motion and neck supple. No rigidity.   Lymphadenopathy:      Cervical: No cervical adenopathy.   Skin:     General: Skin is warm and dry.      Capillary Refill: Capillary refill takes less than 2 seconds.      Findings: No bruising, ecchymosis, erythema, petechiae or rash.      Nails: There is no clubbing.   Neurological:      Mental Status: He is alert and oriented to person, place, and time.      GCS: GCS eye subscore is 4. GCS verbal subscore is 5. GCS motor subscore is 6.      Cranial Nerves:  No cranial nerve deficit.      Sensory: No sensory deficit.      Motor: No abnormal muscle tone.      Coordination: Coordination normal.      Deep Tendon Reflexes: Reflexes normal.   Psychiatric:         Speech: Speech normal.         Behavior: Behavior normal.         Thought Content: Thought content normal.         Judgment: Judgment normal.       Significant Labs: All pertinent labs within the past 24 hours have been reviewed.  BMP:   Recent Labs   Lab 03/17/22 2224   *      K 4.0      CO2 21*   BUN 12   CREATININE 0.8   CALCIUM 10.5   MG 2.5     CBC:   Recent Labs   Lab 03/17/22 2224   WBC 9.10   HGB 15.6   HCT 46.5          Significant Imaging: I have reviewed all pertinent imaging results/findings within the past 24 hours.      Assessment/Plan:      * Asthma exacerbation  · Admit to  - Observation  · Asthma Order Set  · A/A nebulizer treatment q4 when awake  · IV Dexamethasone followed by PO step down therapy  · Initiate po steroids 3/19  · On 2L NC, plan to wean off over the next 12-24 hours  · Patient will be co-managed with RT, peak flow protocol   · Continue Cetirizine, Montelukast per home dose   · No clear precipitants - possible inhalation of particles at worksite or pollen  · Flu, Covid negative   · CXR WNL  · Vitals, pulse oximetry   · FULL CODE       VTE Risk Mitigation (From admission, onward)         Ordered     IP VTE HIGH RISK PATIENT  Once         03/17/22 2037     Place sequential compression device  Until discontinued         03/17/22 2037                Discharge Planning   CINDA: 3/19/2022     Code Status: Full Code   Is the patient medically ready for discharge?:     Reason for patient still in hospital (select all that apply): Treatment  Discharge Plan A: Home with family                  Giovanna Osei PA-C  Department of Hospital Medicine   [facility] - Telemetry Stepdown ([place])

## 2022-03-18 NOTE — PROVIDER PROGRESS NOTES - EMERGENCY DEPT.
Encounter Date: 3/17/2022    ED Physician Progress Notes               care transferred from ED MD ANTONIO Mckinney to myself with plan to reassess for this patient with asthma exacerbation and hypoxia to 93% on room air.  Prior ED MD already gave 60 mg p.o. prednisone, 3 DuoNebs.  COVID swab negative.  Reassessed patient still with wheezing throughout bilaterally, sats 92% on room air.  Added chest x-ray, flu swab, albuterol 10 mg nebulizer.      X-Ray Chest PA And Lateral   Final Result      No acute intrathoracic abnormality.      Electronically signed by resident: Bishnu Fuentes   Date:    03/17/2022   Time:    19:57      Electronically signed by: Mirza Muñoz MD   Date:    03/17/2022   Time:    20:11            Labs Reviewed   SARS-COV-2 RDRP GENE    Narrative:     This test utilizes isothermal nucleic acid amplification   technology to detect the SARS-CoV-2 RdRp nucleic acid segment.   The analytical sensitivity (limit of detection) is 125 genome   equivalents/mL.   A POSITIVE result implies infection with the SARS-CoV-2 virus;   the patient is presumed to be contagious.     A NEGATIVE result means that SARS-CoV-2 nucleic acids are not   present above the limit of detection. A NEGATIVE result should be   treated as presumptive. It does not rule out the possibility of   COVID-19 and should not be the sole basis for treatment decisions.   If COVID-19 is strongly suspected based on clinical and exposure   history, re-testing using an alternate molecular assay should be   considered.   This test is only for use under the Food and Drug   Administration s Emergency Use Authorization (EUA).   Commercial kits are provided by [employer].   Performance characteristics of the EUA have been independently   verified by [facility] Department of   Pathology and Laboratory Medicine.   _________________________________________________________________   The authorized Fact Sheet for Healthcare Providers and the  authorized Fact   Sheet for Patients of the ID NOW COVID-19 are available on the FDA   website:     https://www.fda.gov/media/221924/download  https://www.fda.gov/media/351378/download          POCT INFLUENZA A/B MOLECULAR     Reassessed, patient hypoxic to 91% on room air, continues to have inspiratory expiratory wheezing throughout.  Added IV magnesium. Case discussed with Hospital Medicine for observation.

## 2022-03-19 VITALS
HEART RATE: 80 BPM | SYSTOLIC BLOOD PRESSURE: 131 MMHG | BODY MASS INDEX: 31.56 KG/M2 | RESPIRATION RATE: 18 BRPM | DIASTOLIC BLOOD PRESSURE: 68 MMHG | WEIGHT: 196.38 LBS | HEIGHT: 66 IN | OXYGEN SATURATION: 96 % | TEMPERATURE: 98 F

## 2022-03-19 PROCEDURE — 94640 AIRWAY INHALATION TREATMENT: CPT

## 2022-03-19 PROCEDURE — G0378 HOSPITAL OBSERVATION PER HR: HCPCS

## 2022-03-19 PROCEDURE — 99217 PR OBSERVATION CARE DISCHARGE: CPT | Mod: ,,,

## 2022-03-19 PROCEDURE — 25000003 PHARM REV CODE 250: Performed by: INTERNAL MEDICINE

## 2022-03-19 PROCEDURE — 63600175 PHARM REV CODE 636 W HCPCS: Performed by: INTERNAL MEDICINE

## 2022-03-19 PROCEDURE — 99217 PR OBSERVATION CARE DISCHARGE: ICD-10-PCS | Mod: ,,,

## 2022-03-19 PROCEDURE — 94761 N-INVAS EAR/PLS OXIMETRY MLT: CPT

## 2022-03-19 PROCEDURE — 25000242 PHARM REV CODE 250 ALT 637 W/ HCPCS: Performed by: INTERNAL MEDICINE

## 2022-03-19 RX ORDER — PREDNISONE 20 MG/1
40 TABLET ORAL DAILY
Qty: 4 TABLET | Refills: 0 | Status: SHIPPED | OUTPATIENT
Start: 2022-03-19 | End: 2022-03-21

## 2022-03-19 RX ORDER — ALBUTEROL SULFATE 90 UG/1
2 AEROSOL, METERED RESPIRATORY (INHALATION) EVERY 6 HOURS PRN
Qty: 18 G | Refills: 1 | Status: SHIPPED | OUTPATIENT
Start: 2022-03-19 | End: 2022-04-18

## 2022-03-19 RX ADMIN — IPRATROPIUM BROMIDE AND ALBUTEROL SULFATE 3 ML: 2.5; .5 SOLUTION RESPIRATORY (INHALATION) at 07:03

## 2022-03-19 RX ADMIN — FLUTICASONE FUROATE 1 PUFF: 200 POWDER RESPIRATORY (INHALATION) at 07:03

## 2022-03-19 RX ADMIN — CETIRIZINE HYDROCHLORIDE 10 MG: 5 TABLET ORAL at 09:03

## 2022-03-19 RX ADMIN — PREDNISONE 40 MG: 20 TABLET ORAL at 09:03

## 2022-03-19 NOTE — NURSING
The patient is awake and alert with no complaints of pain or SOB. He is awaiting discharge this morning.

## 2022-03-20 NOTE — DISCHARGE SUMMARY
[facility] - Telemetry Stepdown ([place] [address])  [facility] Medicine  Discharge Summary      Patient Name: Ronny Byers  MRN: [id number]  Patient Class: OP- Observation  Admission Date: 3/17/2022  Hospital Length of Stay: 0 days  Discharge Date and Time: 3/19/2022 12:52 PM  Attending Physician: No att. providers found   Discharging Provider: Giovanna Osei PA-C  Primary Care Provider: No primary care provider on file.  Hospital Medicine Team: [facility] Giovanna Osei PA-C    HPI:   36M with PMH asthma, seasonal allergies who p/w asthma exacerbation with sudden onset today at work.  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  Wheezing, dyspnea principal symptoms endorsed.  Cough (nonproductive) also endorsed.  Denies sick contacts.  Denies any infectious symptomatolgy.  Has had asthma attacks before, but none to this severity.   First time in ED, hospital for this.  Reports use of Cetirizine, Montelukast, and Albuterol INH with good control typically.  He reports having some allergic symptoms to pet dander, hair.      In ED given steroids, nebulized treatment with good response.  Wheezing persists though patient comfortable, speaking full sentences, and GEN.  Patient agreeable to further management.  CXR WNL. Flu, COVID negative.  Other labs pending - ordered. Will be admitted for management with asthma order set.   History obtained with assistance by .       * No surgery found *      Hospital Course:   Patient admitted to observation for asthma exacerbation. Receiving scheduled Duonebs and steroids. IV dexamethasone initiated for 24 hours at admission, will de-escalate with prednisone 40 mg BID. Patient reports improvement in shortness of breath, but is still experiencing wheezing and requiring 2L NC. Wheezing and shortness of breath resolved, stable for discharge. Albuterol, prednisone, and ICS delivered to bedside. PCP appointment set up with  [facility]. Return precautions given, patient verbalized understanding.        Goals of Care Treatment Preferences:  Code Status: Full Code      Consults:     * Asthma exacerbation  · Admit to  - Observation  · Asthma Order Set  · IV Dexamethasone followed by PO step down therapy  · Initiate po steroids 3/19  · On 2L NC, plan to wean off over the next 12-24 hours  · Continue Cetirizine, Montelukast per home dose   · No clear precipitants - possible inhalation of particles at worksite or pollen  · Flu, Covid negative   · CXR WNL  · FULL CODE   · Stable for discharge with PCP follow-up  · Inhalers and prednisone delivered to bedside        Final Active Diagnoses:    Diagnosis Date Noted POA    PRINCIPAL PROBLEM:  Asthma exacerbation [J45.901] 03/17/2022 Yes      Problems Resolved During this Admission:       Discharged Condition: good    Disposition: Home or Self Care    Follow Up:   Follow-up Information     [facility]    Why: Hospital follow up visit at 2:00pm with Dr. Ginger Jernigan. Please bring your discharge summary, ID and current medications. Location phone number is [phone]  Contact information:  [address] [place] [address]  [phone]                       Patient Instructions:      Notify your health care provider if you experience any of the following:  difficulty breathing or increased cough         Pending Diagnostic Studies:     None         Medications:  Reconciled Home Medications:      Medication List      START taking these medications    albuterol 90 mcg/actuation inhaler  Commonly known as: VENTOLIN HFA  Inhale 2 aspiraciones para los pulmones cada 6 (seis) horas según sea necesario para la sibilancia. Rescate  (Inhale 2 puffs into the lungs every 6 (six) hours as needed for Wheezing. Rescue)     ARNUITY ELLIPTA 200 mcg/actuation inhaler  Generic drug: fluticasone furoate  Inhale 1 puff into the lungs once daily.  Controller     predniSONE 20 MG tablet  Commonly known as: DELTASONE  Take 2 tablets (40 mg total) by mouth once daily for 2 days        CONTINUE taking these medications    cetirizine 10 MG tablet  Commonly known as: ZYRTEC  Take 10 mg by mouth once daily.     montelukast 10 mg tablet  Commonly known as: SINGULAIR  Take 10 mg by mouth every evening.     terbinafine  mg tablet  Commonly known as: LAMISIL  Take 250 mg by mouth once daily.            Indwelling Lines/Drains at time of discharge:   Lines/Drains/Airways     None                 Time spent on the discharge of patient: 35 minutes         Giovanna Osei PA-C  Department of Hospital Medicine  [facility] - Telemetry Stepdown ([place]-[facility])

## 2022-03-20 NOTE — ASSESSMENT & PLAN NOTE
· Admit to  - Observation  · Asthma Order Set  · IV Dexamethasone followed by PO step down therapy  · Initiate po steroids 3/19  · On 2L NC, plan to wean off over the next 12-24 hours  · Continue Cetirizine, Montelukast per home dose   · No clear precipitants - possible inhalation of particles at worksite or pollen  · Flu, Covid negative   · CXR WNL  · FULL CODE   · Stable for discharge with PCP follow-up  · Inhalers and prednisone delivered to bedside

## 2022-03-21 NOTE — PLAN OF CARE
[facility] - Telemetry Stepdown ([facility])  Discharge Final Note    Primary Care Provider: No primary care provider on file.    Expected Discharge Date: 3/19/2022    Patient discharged to home via personal transportation.     Patient's bedside nurse and patient notified of the above.      Final Discharge Note (most recent)       Final Note - 03/21/22 0925          Final Note    Assessment Type Final Discharge Note (P)      Anticipated Discharge Disposition Home or Self Care (P)         Post-Acute Status    Post-Acute Authorization Other (P)      Other Status No Post-Acute Service Needs (P)                      Important Message from Medicare             Contact Info       [facility]    [address]  [place] [address]   Phone: [phone]       Next Steps: Follow up on 3/31/2022    Instructions: Hospital follow up visit at 2:00pm with Dr. Ginger Jernigan. Please bring your discharge summary, ID and current medications. Location phone number is [phone]            No future appointments.    SW scheduled post-discharge follow-up appointment and information added to AVS.     Virginia Zheng LMSW  [facility]  Ext. [phone]